# Patient Record
Sex: MALE | Race: WHITE | Employment: OTHER | URBAN - METROPOLITAN AREA
[De-identification: names, ages, dates, MRNs, and addresses within clinical notes are randomized per-mention and may not be internally consistent; named-entity substitution may affect disease eponyms.]

---

## 2019-05-29 ENCOUNTER — NURSING HOME VISIT (OUTPATIENT)
Dept: GERIATRICS | Facility: CLINIC | Age: 79
End: 2019-05-29
Payer: MEDICARE

## 2019-05-29 VITALS
HEART RATE: 61 BPM | SYSTOLIC BLOOD PRESSURE: 158 MMHG | DIASTOLIC BLOOD PRESSURE: 82 MMHG | RESPIRATION RATE: 16 BRPM | HEIGHT: 72 IN | OXYGEN SATURATION: 94 % | TEMPERATURE: 98.6 F | BODY MASS INDEX: 26.33 KG/M2 | WEIGHT: 194.4 LBS

## 2019-05-29 DIAGNOSIS — Z95.0 CARDIAC PACEMAKER IN SITU: ICD-10-CM

## 2019-05-29 DIAGNOSIS — Z98.890 STATUS POST LUMBAR SPINE OPERATIVE PROCEDURE FOR DECOMPRESSION OF SPINAL CORD: ICD-10-CM

## 2019-05-29 DIAGNOSIS — N40.1 BENIGN PROSTATIC HYPERPLASIA WITH LOWER URINARY TRACT SYMPTOMS, SYMPTOM DETAILS UNSPECIFIED: ICD-10-CM

## 2019-05-29 DIAGNOSIS — M43.17 SPONDYLOLISTHESIS OF LUMBOSACRAL REGION: Primary | ICD-10-CM

## 2019-05-29 DIAGNOSIS — M25.552 CHRONIC LEFT HIP PAIN: ICD-10-CM

## 2019-05-29 DIAGNOSIS — R60.0 BILATERAL LEG EDEMA: ICD-10-CM

## 2019-05-29 DIAGNOSIS — I10 ESSENTIAL HYPERTENSION: ICD-10-CM

## 2019-05-29 DIAGNOSIS — R52 ACUTE PAIN: ICD-10-CM

## 2019-05-29 DIAGNOSIS — R53.81 DEBILITY: ICD-10-CM

## 2019-05-29 DIAGNOSIS — G89.29 CHRONIC LEFT HIP PAIN: ICD-10-CM

## 2019-05-29 PROCEDURE — 99310 SBSQ NF CARE HIGH MDM 45: CPT | Performed by: NURSE PRACTITIONER

## 2019-05-29 RX ORDER — HYDROCHLOROTHIAZIDE 50 MG/1
25 TABLET ORAL DAILY
COMMUNITY

## 2019-05-29 RX ORDER — METOPROLOL TARTRATE 100 MG
50 TABLET ORAL 2 TIMES DAILY
COMMUNITY

## 2019-05-29 RX ORDER — MULTIVIT WITH MINERALS/LUTEIN
1 TABLET ORAL DAILY
COMMUNITY

## 2019-05-29 RX ORDER — NICOTINE POLACRILEX 4 MG/1
20 GUM, CHEWING ORAL DAILY
COMMUNITY

## 2019-05-29 RX ORDER — FINASTERIDE 5 MG/1
5 TABLET, FILM COATED ORAL DAILY
COMMUNITY

## 2019-05-29 RX ORDER — OXYBUTYNIN CHLORIDE 15 MG/1
15 TABLET, EXTENDED RELEASE ORAL DAILY
COMMUNITY

## 2019-05-29 RX ORDER — NAPROXEN 500 MG/1
500 TABLET ORAL 2 TIMES DAILY WITH MEALS
COMMUNITY
Start: 2019-08-24 | End: 2019-05-29

## 2019-05-29 RX ORDER — POLYETHYLENE GLYCOL 3350 17 G/17G
1 POWDER, FOR SOLUTION ORAL DAILY
COMMUNITY

## 2019-05-29 RX ORDER — AMLODIPINE BESYLATE 10 MG/1
10 TABLET ORAL DAILY
COMMUNITY

## 2019-05-29 RX ORDER — ACETAMINOPHEN 500 MG
1000 TABLET ORAL 3 TIMES DAILY
COMMUNITY

## 2019-05-29 RX ORDER — SENNOSIDES A AND B 8.6 MG/1
2 TABLET, FILM COATED ORAL 2 TIMES DAILY
COMMUNITY

## 2019-05-29 RX ORDER — LORATADINE 10 MG/1
10 TABLET ORAL DAILY
COMMUNITY

## 2019-05-29 ASSESSMENT — MIFFLIN-ST. JEOR: SCORE: 1621.85

## 2019-05-29 NOTE — LETTER
"    5/29/2019        RE: Thien Garcia  [address]  [place] [address]        [place] [facility]  PRIMARY CARE PROVIDER AND CLINIC:  Marcell Vega PA-C, [facility] [address] / [place]*  Chief Complaint   Patient presents with     Hospital F/U     [facility] Medical Record Number:  [id number]  Place of Service where encounter took place:  [facility] [place] ([facility]) [[id number]]    Thien Garcia  is a 79 year old  (1940), admitted to the above facility from  [facility] . Hospital stay 5/24/2019 through 5/28/2019..  Admitted to this facility for  rehab, medical management and nursing care.    HPI:    HPI information obtained from: facility chart records, facility staff, patient report and [facility] chart review.     Brief Summary of Hospital Course:   Mr. Garcia is a 80 y/o male whom has a h/o HTN, PPM, BPH and chronic Left hip pain whom lives in [facility] and reports he was deferred down to Dr. Velasquez and underwent an extensive lumber surgery:  S/P repair of fractured rods S1 to ilium bilaterally, redo fusion L5 to S1 Bilateral, Decompression - Foraminotomy L5 to S1 Bilateral, anterior fusion L5 to S1 on 5/24/2019 by Dr. Velasquez, Jayden Peterson.  There is no formal DC summary but notes indicate there was a dural tear at one point.  Last note indicated patient doing well, thus he has transitioned to the TCU for ongoing cares and PT/OT.     Updates on Status Since Skilled nursing Admission:   In meeting Mr. Garcia, he reports he is upset because he reports he has chronic Left hip/buttocks pain, and he reports he was under impression this was supposed to take care of it.  Reports his ongoing Left hip/buttocks pain is still 8/10 and not changed.  He endorses 6/10 surgical pain in low back.  He reports ongoing chronic urine retention symptoms for some time, reports he underwent a \"Procedure\" to fix it, reports no change since then.  " Otherwise no complaints, reports he does not want his supplements while at the TCU.  Has already be up and ambulating.     CODE STATUS/ADVANCE DIRECTIVES DISCUSSION:   CPR/Full code   Patient's living condition: lives alone     ALLERGIES: Penicillin g  PAST MEDICAL HISTORY:  has no past medical history on file.  PAST SURGICAL HISTORY:   has no past surgical history on file.  FAMILY HISTORY: family history is not on file.  SOCIAL HISTORY:       Post Discharge Medication Reconciliation Status: discharge medications reconciled and changed, per note/orders (see AVS)    Current Outpatient Medications   Medication Sig Dispense Refill     acetaminophen (TYLENOL) 500 MG tablet Take 1,000 mg by mouth 3 times daily       amLODIPine (NORVASC) 10 MG tablet Take 10 mg by mouth daily       Ascorbic Acid 250 MG CHEW Take 250 mg by mouth daily       CRANBERRY JUICE EXTRACT PO Take 1 tablet by mouth daily       finasteride (PROSCAR) 5 MG tablet Take 5 mg by mouth daily       GLUCOSAMINE-CHONDROIT-VIT C-MN PO Take 400 mg by mouth daily       hydrochlorothiazide (HYDRODIURIL) 50 MG tablet Take 25 mg by mouth daily       loratadine (CLARITIN) 10 MG tablet Take 10 mg by mouth daily       metoprolol tartrate (LOPRESSOR) 100 MG tablet Take 50 mg by mouth 2 times daily       multivitamin (CENTRUM SILVER) tablet Take 1 tablet by mouth daily       omeprazole 20 MG tablet Take 20 mg by mouth daily Before a meal       oxybutynin ER (DITROPAN XL) 15 MG 24 hr tablet Take 15 mg by mouth daily       oxyCODONE HCl (OXAYDO) 5 MG TABA Take 5-10 mg by mouth every 4 hours as needed (5 mg for pain rated 1-5 and 10 mg for pain rated 6-10.) 60 each 0     polyethylene glycol (MIRALAX/GLYCOLAX) packet Take 1 packet by mouth daily       senna (SENOKOT) 8.6 MG tablet Take 2 tablets by mouth 2 times daily       ROS:  7 point ROS done including, light headedness/dizziness, fever/chills, pain, Resp, CV, GI, and  and is negative other than noted in  "HPI.    Vitals:  /82   Pulse 61   Temp 98.6  F (37  C)   Resp 16   Ht 1.816 m (5' 11.5\")   Wt 88.2 kg (194 lb 6.4 oz)   SpO2 94%   BMI 26.74 kg/m        Exam:  GENERAL APPEARANCE:  Elderly male sitting up in chair, NAD, non-toxic.  ENT:  Mouth and oropharynx normal, moist mucous membranes.   RESP:  Lungs CTA.  Regular relaxed breathing effort.  No cough.   CV: S1/S2 no murmur or rubs.  Regular rhythm and rate.    ABDOMEN:   Protuberant, soft, non-tender with active bowel sounds.  No guarding, rigidity, or rebound tenderness.  EXTREMITIES:  1+ bilateral lower extremity edema, no calf tenderness.  Compression in place.   PSYCH: Alert and orientated, pleasant and cooperative.   WOUNDS: Large lumbar incision approximated with steri strips, no s/s of infection.  Small abdominal midline incision approximated with steri strips, no s/s of infection.      Lab/Diagnostic data:  I have personally reviewed labs, which are in facility or EMR chart.     ASSESSMENT/PLAN:  Spondylolisthesis of lumbosacral region  Status post lumbar spine operative procedure for decompression of spinal cord; s/p L5-S1 bilateral redo of decompression, foraminotomy, and anterior fusion, 24 May  Acute pain  S/p large lumbar surgery.  Incisions approximated with steri strips, no s/s of infection.  He is already up and ambulating independently with walker.   -Changed Acetaminophen to TID scheduled.   -Oxycodone PRN.   --Holding home Naproxen 3 months per DC recommendations.   --Ortho f/u July 2nd.     Chronic left hip pain  Has Left hip/buttocks pain that radiates down lateral left side to above knee area.  Reports 8/10 and chronic, unchanged after surgery.  Upset as he was hoping this would fix it.  He is requesting special massage/OT treatments which he was getting up in [facility].   -Analgesics as noted above.   -Did pass along massage/OT instructions he had copy of from [facility], to see if we can perform here.     Essential " hypertension  Cardiac pacemaker in situ  Bilateral leg edema  -Continued PTA Norvasc, hydrochlorothiazide and Metoprolol.   -Using home compression stockings.     Benign prostatic hyperplasia with lower urinary tract symptoms, symptom details unspecified  Has urgency, dribbles, frequent small volume symptoms.   Reports ongoing for some time, is s/p TURP in February, no improvement since then.    -Continues PTA Finasteride and Oxybutynin.   -PVR's q daily x5 days and with symptoms to monitor for retention.   --May need Ortho f/u once home.     Debility  -PT/OT to eval and treat.   -PT/OT given copy of massage/pain treatment he was getting at home.     Orders written by provider at facility  -As noted above.     Total time spent with patient visit at the skilled nursing facility was 45 min including patient visit and review of past records. Greater than 50% of total time spent with counseling and coordinating care due to admission/establish new care, complex medical case, review of HPI, development of POC, patient education and review of POC with pt.      Electronically signed by:  CHUCK Galicia CNP       Sincerely,        CHUCK Galicia CNP

## 2019-05-29 NOTE — PROGRESS NOTES
"[place] [facility]  PRIMARY CARE PROVIDER AND CLINIC:  Marcell Vega PA-C, [facility] [address] / [place]*  Chief Complaint   Patient presents with     Hospital F/U     [facility] Medical Record Number:  [id number]  Place of Service where encounter took place:  [facility] [place] ([facility]) [[id number]]    Thien Garcia  is a 79 year old  (1940), admitted to the above facility from  [facility] . Hospital stay 5/24/2019 through 5/28/2019..  Admitted to this facility for  rehab, medical management and nursing care.    HPI:    HPI information obtained from: facility chart records, facility staff, patient report and [facility] chart review.     Brief Summary of Hospital Course:   Mr. Garcia is a 80 y/o male whom has a h/o HTN, PPM, BPH and chronic Left hip pain whom lives in [facility] and reports he was deferred down to Dr. Velasquez and underwent an extensive lumber surgery:  S/P repair of fractured rods S1 to ilium bilaterally, redo fusion L5 to S1 Bilateral, Decompression - Foraminotomy L5 to S1 Bilateral, anterior fusion L5 to S1 on 5/24/2019 by Dr. Velasquez, Jayden Peterson.  There is no formal DC summary but notes indicate there was a dural tear at one point.  Last note indicated patient doing well, thus he has transitioned to the TCU for ongoing cares and PT/OT.     Updates on Status Since Skilled nursing Admission:   In meeting Mr. Garcia, he reports he is upset because he reports he has chronic Left hip/buttocks pain, and he reports he was under impression this was supposed to take care of it.  Reports his ongoing Left hip/buttocks pain is still 8/10 and not changed.  He endorses 6/10 surgical pain in low back.  He reports ongoing chronic urine retention symptoms for some time, reports he underwent a \"Procedure\" to fix it, reports no change since then.  Otherwise no complaints, reports he does not want his supplements while at the TCU.  Has " "already be up and ambulating.     CODE STATUS/ADVANCE DIRECTIVES DISCUSSION:   CPR/Full code   Patient's living condition: lives alone     ALLERGIES: Penicillin g  PAST MEDICAL HISTORY:  has no past medical history on file.  PAST SURGICAL HISTORY:   has no past surgical history on file.  FAMILY HISTORY: family history is not on file.  SOCIAL HISTORY:       Post Discharge Medication Reconciliation Status: discharge medications reconciled and changed, per note/orders (see AVS)    Current Outpatient Medications   Medication Sig Dispense Refill     acetaminophen (TYLENOL) 500 MG tablet Take 1,000 mg by mouth 3 times daily       amLODIPine (NORVASC) 10 MG tablet Take 10 mg by mouth daily       Ascorbic Acid 250 MG CHEW Take 250 mg by mouth daily       CRANBERRY JUICE EXTRACT PO Take 1 tablet by mouth daily       finasteride (PROSCAR) 5 MG tablet Take 5 mg by mouth daily       GLUCOSAMINE-CHONDROIT-VIT C-MN PO Take 400 mg by mouth daily       hydrochlorothiazide (HYDRODIURIL) 50 MG tablet Take 25 mg by mouth daily       loratadine (CLARITIN) 10 MG tablet Take 10 mg by mouth daily       metoprolol tartrate (LOPRESSOR) 100 MG tablet Take 50 mg by mouth 2 times daily       multivitamin (CENTRUM SILVER) tablet Take 1 tablet by mouth daily       omeprazole 20 MG tablet Take 20 mg by mouth daily Before a meal       oxybutynin ER (DITROPAN XL) 15 MG 24 hr tablet Take 15 mg by mouth daily       oxyCODONE HCl (OXAYDO) 5 MG TABA Take 5-10 mg by mouth every 4 hours as needed (5 mg for pain rated 1-5 and 10 mg for pain rated 6-10.) 60 each 0     polyethylene glycol (MIRALAX/GLYCOLAX) packet Take 1 packet by mouth daily       senna (SENOKOT) 8.6 MG tablet Take 2 tablets by mouth 2 times daily       ROS:  7 point ROS done including, light headedness/dizziness, fever/chills, pain, Resp, CV, GI, and  and is negative other than noted in HPI.    Vitals:  /82   Pulse 61   Temp 98.6  F (37  C)   Resp 16   Ht 1.816 m (5' 11.5\")   " Wt 88.2 kg (194 lb 6.4 oz)   SpO2 94%   BMI 26.74 kg/m       Exam:  GENERAL APPEARANCE:  Elderly male sitting up in chair, NAD, non-toxic.  ENT:  Mouth and oropharynx normal, moist mucous membranes.   RESP:  Lungs CTA.  Regular relaxed breathing effort.  No cough.   CV: S1/S2 no murmur or rubs.  Regular rhythm and rate.    ABDOMEN:   Protuberant, soft, non-tender with active bowel sounds.  No guarding, rigidity, or rebound tenderness.  EXTREMITIES:  1+ bilateral lower extremity edema, no calf tenderness.  Compression in place.   PSYCH: Alert and orientated, pleasant and cooperative.   WOUNDS: Large lumbar incision approximated with steri strips, no s/s of infection.  Small abdominal midline incision approximated with steri strips, no s/s of infection.      Lab/Diagnostic data:  I have personally reviewed labs, which are in facility or EMR chart.     ASSESSMENT/PLAN:  Spondylolisthesis of lumbosacral region  Status post lumbar spine operative procedure for decompression of spinal cord; s/p L5-S1 bilateral redo of decompression, foraminotomy, and anterior fusion, 24 May  Acute pain  S/p large lumbar surgery.  Incisions approximated with steri strips, no s/s of infection.  He is already up and ambulating independently with walker.   -Changed Acetaminophen to TID scheduled.   -Oxycodone PRN.   --Holding home Naproxen 3 months per DC recommendations.   --Ortho f/u July 2nd.     Chronic left hip pain  Has Left hip/buttocks pain that radiates down lateral left side to above knee area.  Reports 8/10 and chronic, unchanged after surgery.  Upset as he was hoping this would fix it.  He is requesting special massage/OT treatments which he was getting up in [facility].   -Analgesics as noted above.   -Did pass along massage/OT instructions he had copy of from [facility], to see if we can perform here.     Essential hypertension  Cardiac pacemaker in situ  Bilateral leg edema  -Continued PTA Norvasc, hydrochlorothiazide and  Metoprolol.   -Using home compression stockings.     Benign prostatic hyperplasia with lower urinary tract symptoms, symptom details unspecified  Has urgency, dribbles, frequent small volume symptoms.   Reports ongoing for some time, is s/p TURP in February, no improvement since then.    -Continues PTA Finasteride and Oxybutynin.   -PVR's q daily x5 days and with symptoms to monitor for retention.   --May need Ortho f/u once home.     Debility  -PT/OT to eval and treat.   -PT/OT given copy of massage/pain treatment he was getting at home.     Orders written by provider at facility  -As noted above.     Total time spent with patient visit at the skilled nursing facility was 45 min including patient visit and review of past records. Greater than 50% of total time spent with counseling and coordinating care due to admission/establish new care, complex medical case, review of HPI, development of POC, patient education and review of POC with pt.      Electronically signed by:  CHUCK Galicia CNP

## 2019-05-30 ENCOUNTER — DOCUMENTATION ONLY (OUTPATIENT)
Dept: OTHER | Facility: CLINIC | Age: 79
End: 2019-05-30

## 2019-05-31 ENCOUNTER — NURSING HOME VISIT (OUTPATIENT)
Dept: GERIATRICS | Facility: CLINIC | Age: 79
End: 2019-05-31
Payer: MEDICARE

## 2019-05-31 ENCOUNTER — NURSING HOME VISIT (OUTPATIENT)
Dept: GERIATRICS | Facility: CLINIC | Age: 79
End: 2019-05-31

## 2019-05-31 VITALS
TEMPERATURE: 98.6 F | SYSTOLIC BLOOD PRESSURE: 152 MMHG | BODY MASS INDEX: 25.73 KG/M2 | HEART RATE: 67 BPM | WEIGHT: 190 LBS | HEIGHT: 72 IN | OXYGEN SATURATION: 96 % | RESPIRATION RATE: 18 BRPM | DIASTOLIC BLOOD PRESSURE: 75 MMHG

## 2019-05-31 DIAGNOSIS — R53.81 PHYSICAL DECONDITIONING: ICD-10-CM

## 2019-05-31 DIAGNOSIS — I10 ESSENTIAL HYPERTENSION: ICD-10-CM

## 2019-05-31 DIAGNOSIS — Z47.89 AFTERCARE FOLLOWING SURGERY OF THE MUSCULOSKELETAL SYSTEM: Primary | ICD-10-CM

## 2019-05-31 DIAGNOSIS — N40.0 BENIGN PROSTATIC HYPERPLASIA WITHOUT LOWER URINARY TRACT SYMPTOMS: ICD-10-CM

## 2019-05-31 DIAGNOSIS — Z98.890 STATUS POST LUMBAR SPINE OPERATIVE PROCEDURE FOR DECOMPRESSION OF SPINAL CORD: Primary | ICD-10-CM

## 2019-05-31 DIAGNOSIS — Z98.1 S/P LUMBAR FUSION: ICD-10-CM

## 2019-05-31 DIAGNOSIS — K27.9 PUD (PEPTIC ULCER DISEASE): ICD-10-CM

## 2019-05-31 DIAGNOSIS — M79.18 PAIN IN LEFT BUTTOCK: ICD-10-CM

## 2019-05-31 DIAGNOSIS — K59.01 SLOW TRANSIT CONSTIPATION: ICD-10-CM

## 2019-05-31 PROCEDURE — 99306 1ST NF CARE HIGH MDM 50: CPT | Performed by: INTERNAL MEDICINE

## 2019-05-31 RX ORDER — GABAPENTIN 100 MG/1
100 CAPSULE ORAL AT BEDTIME
COMMUNITY
End: 2019-06-06

## 2019-05-31 ASSESSMENT — MIFFLIN-ST. JEOR: SCORE: 1601.89

## 2019-05-31 NOTE — PROGRESS NOTES
[place] [facility]  INITIAL VISIT NOTE  May 31, 2019    PRIMARY CARE PROVIDER AND CLINIC:  [facility] [address] / [place]*    Chief Complaint   Patient presents with     Hospital F/U       HPI:    Thien Garcia is a 79 year old  (1940) male who was seen at [facility] on May 31, 2019 for an initial visit. Medical history is notable for HTN, SSS s/p PPM and BPH s/p TURP. He was hospitalized at [facility] from 5/24/19 to 5/28/19 where he is s/p repair fractured rods S1 to ilium, redo fusion L5 to S1, bilateral decompression and foraminotomy L5 to S1 and anterior fusion L5 to S1. Surgery without complication.  was admitted to this facility for medical management and rehab.     Today, Mr. Garcia is seen in his room after breakfast. His main concern is left buttock pain. He can pinpoint the one spot that hurts. This is subacute to chronic. He thought the back surgery was going to fix it. He's had PT for this in [place], [place] and PT here is doing deep massage and muscle release. Pain is worse when he first gets out of bed. It is sometimes burning. Does not radiate down his leg. No numbness or tingling. No chest pain or dyspnea. No abdominal pain. No diarrhea or constipation. Discussed with nursing - no concerns today. Discussed with OT re: buttock pain.     CODE STATUS:   CPR/Full code     ALLERGIES:     Allergies   Allergen Reactions     Penicillin G        PAST MEDICAL HISTORY:   HTN, BPH, ventricular arrhythmia s/p PPM    PAST SURGICAL HISTORY:   No past surgical history on file.    FAMILY HISTORY:   Reviewed and non-contributory    SOCIAL HISTORY:   . Lives in an apartment in [place], [place]. 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     MEDICATIONS:  Current Outpatient Medications   Medication Sig Dispense Refill     acetaminophen (TYLENOL) 500 MG tablet Take 1,000 mg by mouth 3 times daily       amLODIPine (NORVASC) 10 MG tablet Take 10 mg by mouth  "daily       Ascorbic Acid 250 MG CHEW Take 250 mg by mouth daily       finasteride (PROSCAR) 5 MG tablet Take 5 mg by mouth daily       gabapentin (NEURONTIN) 100 MG capsule Take 100 mg by mouth At Bedtime        hydrochlorothiazide (HYDRODIURIL) 50 MG tablet Take 25 mg by mouth daily       loratadine (CLARITIN) 10 MG tablet Take 10 mg by mouth daily       metoprolol tartrate (LOPRESSOR) 100 MG tablet Take 50 mg by mouth 2 times daily       multivitamin (CENTRUM SILVER) tablet Take 1 tablet by mouth daily       omeprazole 20 MG tablet Take 20 mg by mouth daily Before a meal       oxybutynin ER (DITROPAN XL) 15 MG 24 hr tablet Take 15 mg by mouth daily       oxyCODONE HCl (OXAYDO) 5 MG TABA Take 5-10 mg by mouth every 4 hours as needed (5 mg for pain rated 1-5 and 10 mg for pain rated 6-10.) 60 each 0     polyethylene glycol (MIRALAX/GLYCOLAX) packet Take 1 packet by mouth daily       senna (SENOKOT) 8.6 MG tablet Take 2 tablets by mouth 2 times daily           ROS:  10 point ROS neg other than the symptoms noted above in the HPI.    PHYSICAL EXAM:  /75   Pulse 67   Temp 98.6  F (37  C)   Resp 18   Ht 1.816 m (5' 11.5\")   Wt 86.2 kg (190 lb)   SpO2 96%   BMI 26.13 kg/m     Gen: sitting on edge of the bed, alert, cooperative and in no acute distress  HEENT: normocephalic; oropharynx clear  Card: RRR, S1, S2, no murmurs  Resp: lungs clear to auscultation bilaterally  GI: abdomen soft, not-tender  MSK: normal muscle tone, no LE edema; point tenderness near of L buttock   Neuro: CX II-XII grossly in tact; ROM in all four extremities grossly in tact  Psych: alert and oriented x3; normal affect  Skin: both anterior and posterior incisions healing well without erythema or drainage     LABORATORY/IMAGING DATA:  Reviewed as per Epic    ASSESSMENT/PLAN:    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 Incisions healing well. Minimal back pain  -- analgesia with APAP 1000 mg TID and oxycodone 5-10 mg q4h PRN  -- DVT ppx - was not discharged on any; he is ambulating frequently with a walker   -- PT/OT  -- follow up with ortho as scheduled on 7/2/19    Acute on Chronic Left Buttock Pain  This is very limiting. Point tenderness on exam. PT is doing deep tissue, massage as well as (I believe) ultras sound. Was evaluated by CANDY Alvarez PA-C with recommendation for gabapentin.   -- will start gabapentin 100 mg at bedtime daily through the weekend and medical team will re-eval on Monday  -- ongoing PT  -- very much appreciate expertise and assistance of CANDY Alvarez    HTN  Sick Sinus Syndrome s/p PPM   SBPs 150s. Likely elevated in setting of pain  -- continues on amlodipine 10 mg daily, hydrochlorothiazide 25 mg daily and metoprolol 50 mg BID  -- follow BPs and adjust medications as needed    BPH s/p TURP (2/26/19)  Follow with urology - most recent clinic visit 5/14/19.   -- continues on finasteride 5 mg daily and oxybutynin 15 mg daily     Elevated PSA  Follows with urology. Per last clinic note given his age, no longer following PSA. Path from TURP was without malignancy.   -- management per urology    PUD  -- continues on 20 mg daily     Slow Transit Constipation  -- continues on Miralax 17g daily and Senna 2 tabs BID  -- adjust bowel regimen as needed    Physical Deconditioning  In setting of hospitalization and underlying medical conditions  -- ongoing PT/OT      Electronically signed by:  Ariana Gracia MD

## 2019-05-31 NOTE — PROGRESS NOTES
Ortho Nursing home visit    Thien Garcia is a 79 year old male who resides at [facility]    Patient is seen today for Left buttock pain, Chief complaint, following extensive AP/ Fusion revision, done 1 week ago at [facility], due to failed hardware, and surgical findings were, Failed fusion L-5/_S1; New hardware, and bone infuse and cage was used for repair,   Also noted due to scaring, dural tear was identified during procedure; Patient has no post-op complications and was transferred to TCU.      No past medical history on file.   No past surgical history on file.     Allergies   Allergen Reactions     Penicillin G       There were no vitals taken for this visit.     Exam: Supine, allows PROM to both LE, Neg SLR to left LE, neuro intact, 5/5 strength in lower motor Sharif. Denies any numbness, no changes in B&B habits.  Pain while supine in 0/10 but attempts at standing, cause pain posterior SI joint buttock , able to palpate area of concern today, denies any radicular symptoms, and stated, : this was there before surgery.        X-rays not available; from [facility]    ASSESSMENT / PLAN: Discussed trial of Neurontin , 100 MG day 1,   100 mg BID day 2  100 mg TID day3, and to follow.    To help with pain assc: with nerve involvement;     Patient would like to try medication, and feels that the ultrasound is also helping'        Piper ALDRICH  With Ariana Gracia MD.  [phone]

## 2019-05-31 NOTE — LETTER
5/31/2019        RE: Thien Garcia  [address]  [place] [address]        [place] [facility]  INITIAL VISIT NOTE  May 31, 2019    PRIMARY CARE PROVIDER AND CLINIC:  [facility] [address] / [place]*    Chief Complaint   Patient presents with     Hospital F/U       HPI:    Thien Garcia is a 79 year old  (1940) male who [facility] [place] [facility] for an initial visit. Medical history is notable for HTN, SSS s/p PPM and BPH s/p TURP. He was hospitalized at [facility] from 5/24/19 to 5/28/19 where he is s/p repair fractured rods S1 to ilium, redo fusion L5 to S1, bilateral decompression and foraminotomy L5 to S1 and anterior fusion L5 to S1. Surgery without complication.  was admitted to this facility for medical management and rehab.     Today, Mr. Garcia is seen in his room after breakfast. His main concern is left buttock pain. He can pinpoint the one spot that hurts. This is subacute to chronic. He thought the back surgery was going to fix it. He's had PT for this in [place], [place] and PT here is doing deep massage and muscle release. Pain is worse when he first gets out of bed. It is sometimes burning. Does not radiate down his leg. No numbness or tingling. No chest pain or dyspnea. No abdominal pain. No diarrhea or constipation. Discussed with nursing - no concerns today. Discussed with OT re: buttock pain.     CODE STATUS:   CPR/Full code     ALLERGIES:     Allergies   Allergen Reactions     Penicillin G        PAST MEDICAL HISTORY:   HTN, BPH, ventricular arrhythmia s/p PPM    PAST SURGICAL HISTORY:   No past surgical history on file.    FAMILY HISTORY:   Reviewed and non-contributory    SOCIAL HISTORY:   . Lives in an apartment in [place], [place]. 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     MEDICATIONS:  Current Outpatient Medications   Medication Sig Dispense Refill     acetaminophen (TYLENOL) 500 MG tablet Take 1,000  "mg by mouth 3 times daily       amLODIPine (NORVASC) 10 MG tablet Take 10 mg by mouth daily       Ascorbic Acid 250 MG CHEW Take 250 mg by mouth daily       finasteride (PROSCAR) 5 MG tablet Take 5 mg by mouth daily       gabapentin (NEURONTIN) 100 MG capsule Take 100 mg by mouth At Bedtime        hydrochlorothiazide (HYDRODIURIL) 50 MG tablet Take 25 mg by mouth daily       loratadine (CLARITIN) 10 MG tablet Take 10 mg by mouth daily       metoprolol tartrate (LOPRESSOR) 100 MG tablet Take 50 mg by mouth 2 times daily       multivitamin (CENTRUM SILVER) tablet Take 1 tablet by mouth daily       omeprazole 20 MG tablet Take 20 mg by mouth daily Before a meal       oxybutynin ER (DITROPAN XL) 15 MG 24 hr tablet Take 15 mg by mouth daily       oxyCODONE HCl (OXAYDO) 5 MG TABA Take 5-10 mg by mouth every 4 hours as needed (5 mg for pain rated 1-5 and 10 mg for pain rated 6-10.) 60 each 0     polyethylene glycol (MIRALAX/GLYCOLAX) packet Take 1 packet by mouth daily       senna (SENOKOT) 8.6 MG tablet Take 2 tablets by mouth 2 times daily           ROS:  10 point ROS neg other than the symptoms noted above in the HPI.    PHYSICAL EXAM:  /75   Pulse 67   Temp 98.6  F (37  C)   Resp 18   Ht 1.816 m (5' 11.5\")   Wt 86.2 kg (190 lb)   SpO2 96%   BMI 26.13 kg/m      Gen: sitting on edge of the bed, alert, cooperative and in no acute distress  HEENT: normocephalic; oropharynx clear  Card: RRR, S1, S2, no murmurs  Resp: lungs clear to auscultation bilaterally  GI: abdomen soft, not-tender  MSK: normal muscle tone, no LE edema; point tenderness near of L buttock   Neuro: CX II-XII grossly in tact; ROM in all four extremities grossly in tact  Psych: alert and oriented x3; normal affect  Skin: both anterior and posterior incisions healing well without erythema or drainage     LABORATORY/IMAGING DATA:  Reviewed as per Epic    ASSESSMENT/PLAN:    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 Incisions healing well. Minimal back pain  -- analgesia with APAP 1000 mg TID and oxycodone 5-10 mg q4h PRN  -- DVT ppx - was not discharged on any; he is ambulating frequently with a walker   -- PT/OT  -- follow up with ortho as scheduled on 7/2/19    Acute on Chronic Left Buttock Pain  This is very limiting. Point tenderness on exam. PT is doing deep tissue, massage as well as (I believe) ultras sound. Was evaluated by CANDY Alvarez PA-C with recommendation for gabapentin.   -- will start gabapentin 100 mg at bedtime daily through the weekend and medical team will re-eval on Monday  -- ongoing PT  -- very much appreciate expertise and assistance of CANDY Alvarez    HTN  Sick Sinus Syndrome s/p PPM   SBPs 150s. Likely elevated in setting of pain  -- continues on amlodipine 10 mg daily, hydrochlorothiazide 25 mg daily and metoprolol 50 mg BID  -- follow BPs and adjust medications as needed    BPH s/p TURP (2/26/19)  Follow with urology - most recent clinic visit 5/14/19.   -- continues on finasteride 5 mg daily and oxybutynin 15 mg daily     Elevated PSA  Follows with urology. Per last clinic note given his age, no longer following PSA. Path from TURP was without malignancy.   -- management per urology    PUD  -- continues on 20 mg daily     Slow Transit Constipation  -- continues on Miralax 17g daily and Senna 2 tabs BID  -- adjust bowel regimen as needed    Physical Deconditioning  In setting of hospitalization and underlying medical conditions  -- ongoing PT/OT      Electronically signed by:  Ariana Gracia MD                        Sincerely,        Ariana Gracia MD

## 2019-06-04 ENCOUNTER — HOSPITAL LABORATORY (OUTPATIENT)
Dept: OTHER | Facility: CLINIC | Age: 79
End: 2019-06-04

## 2019-06-04 LAB
ANION GAP SERPL CALCULATED.3IONS-SCNC: 7 MMOL/L (ref 3–14)
BUN SERPL-MCNC: 21 MG/DL (ref 7–30)
CALCIUM SERPL-MCNC: 9.6 MG/DL (ref 8.5–10.1)
CHLORIDE SERPL-SCNC: 105 MMOL/L (ref 94–109)
CO2 SERPL-SCNC: 27 MMOL/L (ref 20–32)
CREAT SERPL-MCNC: 0.87 MG/DL (ref 0.66–1.25)
ERYTHROCYTE [DISTWIDTH] IN BLOOD BY AUTOMATED COUNT: 13.6 % (ref 10–15)
GFR SERPL CREATININE-BSD FRML MDRD: 81 ML/MIN/{1.73_M2}
GLUCOSE SERPL-MCNC: 190 MG/DL (ref 70–99)
HCT VFR BLD AUTO: 36 % (ref 40–53)
HGB BLD-MCNC: 12.1 G/DL (ref 13.3–17.7)
MCH RBC QN AUTO: 30.6 PG (ref 26.5–33)
MCHC RBC AUTO-ENTMCNC: 33.6 G/DL (ref 31.5–36.5)
MCV RBC AUTO: 91 FL (ref 78–100)
PLATELET # BLD AUTO: 449 10E9/L (ref 150–450)
POTASSIUM SERPL-SCNC: 4.1 MMOL/L (ref 3.4–5.3)
RBC # BLD AUTO: 3.96 10E12/L (ref 4.4–5.9)
SODIUM SERPL-SCNC: 139 MMOL/L (ref 133–144)
WBC # BLD AUTO: 14.6 10E9/L (ref 4–11)

## 2019-06-06 ENCOUNTER — DISCHARGE SUMMARY NURSING HOME (OUTPATIENT)
Dept: GERIATRICS | Facility: CLINIC | Age: 79
End: 2019-06-06
Payer: MEDICARE

## 2019-06-06 VITALS
HEIGHT: 72 IN | RESPIRATION RATE: 18 BRPM | DIASTOLIC BLOOD PRESSURE: 71 MMHG | BODY MASS INDEX: 25.73 KG/M2 | OXYGEN SATURATION: 92 % | WEIGHT: 190 LBS | SYSTOLIC BLOOD PRESSURE: 160 MMHG | HEART RATE: 78 BPM | TEMPERATURE: 98.4 F

## 2019-06-06 DIAGNOSIS — M43.17 SPONDYLOLISTHESIS OF LUMBOSACRAL REGION: ICD-10-CM

## 2019-06-06 DIAGNOSIS — R53.81 DEBILITY: ICD-10-CM

## 2019-06-06 DIAGNOSIS — Z98.1 S/P LUMBAR FUSION: ICD-10-CM

## 2019-06-06 DIAGNOSIS — Z79.899 POLYPHARMACY: ICD-10-CM

## 2019-06-06 DIAGNOSIS — G89.29 CHRONIC LEFT HIP PAIN: ICD-10-CM

## 2019-06-06 DIAGNOSIS — N40.0 BENIGN PROSTATIC HYPERPLASIA WITHOUT LOWER URINARY TRACT SYMPTOMS: ICD-10-CM

## 2019-06-06 DIAGNOSIS — Z98.890 STATUS POST LUMBAR SPINE OPERATIVE PROCEDURE FOR DECOMPRESSION OF SPINAL CORD: Primary | ICD-10-CM

## 2019-06-06 DIAGNOSIS — M25.552 CHRONIC LEFT HIP PAIN: ICD-10-CM

## 2019-06-06 DIAGNOSIS — R35.1 NOCTURIA: ICD-10-CM

## 2019-06-06 DIAGNOSIS — I10 ESSENTIAL HYPERTENSION: ICD-10-CM

## 2019-06-06 DIAGNOSIS — M79.18 PAIN IN LEFT BUTTOCK: ICD-10-CM

## 2019-06-06 DIAGNOSIS — Z95.0 CARDIAC PACEMAKER IN SITU: ICD-10-CM

## 2019-06-06 DIAGNOSIS — R52 ACUTE PAIN: ICD-10-CM

## 2019-06-06 PROCEDURE — 99316 NF DSCHRG MGMT 30 MIN+: CPT | Performed by: NURSE PRACTITIONER

## 2019-06-06 RX ORDER — GABAPENTIN 100 MG/1
100 CAPSULE ORAL 2 TIMES DAILY
Start: 2019-06-06

## 2019-06-06 ASSESSMENT — MIFFLIN-ST. JEOR: SCORE: 1601.89

## 2019-06-06 NOTE — LETTER
6/6/2019        RE: Thien Gacria  [address]  [place] [address]        [place] [facility] DISCHARGE SUMMARY  PATIENT'S NAME: Thien Garcia  YOB: 1940  MEDICAL RECORD NUMBER:  [id number]  Place of Service where encounter took place:  [facility] [place] ([facility]) [[id number]]    PRIMARY CARE PROVIDER AND CLINIC RESPONSIBLE AFTER TRANSFER:   Marcell Vega PA-C, [facility] [address] / [place]*       Transferring providers: CHUCK Galicia CNP, Ariana Gracia MD  Recent Hospitalization/ED:  [facility]  stay 5/24/2019 to 5/28/2019.  Date of SNF Admission: May / 28 / 2019  Date of SNF (anticipated) Discharge: June / 07 / 2019  Discharged to: previous independent home  Cognitive Scores: SLUMS: 15/30 and CPT: 4.9/5.6  Physical Function: Low fall risk, 300-500 ft with walker  DME: Walker    CODE STATUS/ADVANCE DIRECTIVES DISCUSSION:  Full Code     ALLERGIES: Penicillin g    DISCHARGE DIAGNOSIS/NURSING FACILITY COURSE:   Mr. Garcia is a 80 y/o male whom has a h/o HTN, PPM, BPH and chronic Left hip pain whom lives in [facility] and reports he was deferred down to Dr. Velasquez and underwent an extensive lumber surgery:  S/P repair of fractured rods S1 to ilium bilaterally, redo fusion L5 to S1 Bilateral, Decompression - Foraminotomy L5 to S1 Bilateral, anterior fusion L5 to S1 on 5/24/2019 by Jayden Walter.  There is no formal DC summary but notes indicate there was a dural tear at one point.  Last note indicated patient doing well, thus he has transitioned to the TCU for ongoing cares and PT/OT.     From surgical note: Underwent repair fractured rods S1 to ilium Bilateral, redo fusion L5 to S1 Bilateral, Decompression - Foraminotomy L5 to S1 Bilateral, anterior fusion L5 to S1 on 5/24/2019 by Jayden Walter MD; anesthesia General, EBL: <25 cc, OR time 5 Hr 44 Min 2 Sec with no immediate  complications.    While at the TCU Mr. Garcia did have some pain issues but more from his chronic Left hip/SI area than his actual surgical area.  He was in hospital 3-4 days post surgery, and when he arrived to the TCU he was already up and walking a great deal with a walker independently.  Thus recovered nicely from the surgery.  We did have one of our Ortho PA's visit him and assess his chronic Left hip/SI pain.  They felt it was more SI/nerve pain and though a trail of Gabapentin might work, thus we did start that and had good results.  While at the TCU Mr. Garcia continued to also complain of nocturia, but also not new.  Does have a h/o recent TURP.  Due to recovered briskly from his surgery, was up and mobilizing well, he discharged back to home with outpatient PT.      In meeting Mr. Garcia today, he reports he's ready to return home, has a plan to get back up [facility].  He report his chronic SI pain as much improved on the Gabapentin, but also likes his ongoing massage therapy he get's.  He continues to endorse the nocturia, reports he has to strain a bit to get flow going, but no dysuria or other pain.  No other complaints.     Spondylolisthesis of lumbosacral region  Status post lumbar spine operative procedure for decompression of spinal cord; s/p L5-S1 bilateral redo of decompression, foraminotomy, and anterior fusion, 24 May  S/P lumbar fusion  Acute pain  Detailed surgery noted above.  Has a large spinal and small anterior abdominal incision, but approximated with steri strips, no s/s of infection.  Actual surgical pain has been minimal, was up and walking with walker independently few days after surgery.   -f/u with Surgery planned on/around 2 July.   -Analgesics noted below.   --Surgery did note avoid/hold NSAIDS/Naproxen for 3 months.     Pain in left buttock, chronic  Chronic left hip pain  He reports having workup done up [place], we do not have access to those records, but he reports  "Left buttocks pain as 10/10 and ongoing for some time.  Reports he was getting special massage on Left buttocks area with some results.  We and our Ortho PA felt the pain may be from the SI joint area, PA recommended trial of Gabapentin.  We started with 100 mg's at bedtime and went to BID.  He has had good reduction of pain with this.   -Holding Naproxen as noted above.   -Gabapentin at 100 mg's BID, continue to slowly increase for affect, left to PCP discretion.   -Acetaminophen TID.   -Oxycodone 5-10 mg's q4h PRN.     Essential hypertension  Cardiac pacemaker in situ  SBP has been slightly elevated, 140-160's with WNL HR's but in setting of pain and post surgery, asymptomatic.  Thus we did not make any changes in the acute setting.   No current s/s of clinical CHF.   -Continue to monitor SBP's and adjustments left to PCP discretion.     Benign prostatic hyperplasia without lower urinary tract symptoms  Nocturia  Reports a TURP back in February, reports that helped with retention issues, but he notes ongoing retention symptoms at times, reports spending 5+ minutes at times trying to get \"All\" the urine out.  Reports he is upset that he needs to go 3-4 times a night, but also reports that as ongoing.  Some PVR's at the TCU/rehab here were 100-200 ml's at times.    -Continues Finasteride and Oxybutynin.   --We did recommend he repeat f/u with Urology due to increased PVR's and nocturia.     Debility  Did well with PT/OT.   -We recommend ongoing outpatient PT with [facility].     Polypharmacy  His Vit C, Cranberry, Glucosamine, Vit E were held in TCU/Rehab, can be resumed upon returning home.     Past Medical History:  has no past medical history on file.    Discharge Medications:  Current Outpatient Medications   Medication Sig Dispense Refill     acetaminophen (TYLENOL) 500 MG tablet Take 1,000 mg by mouth 3 times daily       amLODIPine (NORVASC) 10 MG tablet Take 10 mg by mouth daily       Ascorbic " "Acid 250 MG CHEW Take 250 mg by mouth daily       finasteride (PROSCAR) 5 MG tablet Take 5 mg by mouth daily       gabapentin (NEURONTIN) 100 MG capsule Take 1 capsule (100 mg) by mouth 2 times daily       hydrochlorothiazide (HYDRODIURIL) 50 MG tablet Take 25 mg by mouth daily       loratadine (CLARITIN) 10 MG tablet Take 10 mg by mouth daily       metoprolol tartrate (LOPRESSOR) 100 MG tablet Take 50 mg by mouth 2 times daily       multivitamin (CENTRUM SILVER) tablet Take 1 tablet by mouth daily       omeprazole 20 MG tablet Take 20 mg by mouth daily Before a meal       oxybutynin ER (DITROPAN XL) 15 MG 24 hr tablet Take 15 mg by mouth daily       oxyCODONE HCl (OXAYDO) 5 MG TABA Take 5-10 mg by mouth every 4 hours as needed (5 mg for pain rated 1-5 and 10 mg for pain rated 6-10.) 60 each 0     polyethylene glycol (MIRALAX/GLYCOLAX) packet Take 1 packet by mouth daily       senna (SENOKOT) 8.6 MG tablet Take 2 tablets by mouth 2 times daily       Medication Changes/Rationale:   -As noted above.     Controlled medications sent with patient:   Script for PRN Oxycodone medication for 30 tabs and 0 refills given to patient at discharge to have them fill at their out patient pharmacy     ROS:   7 point ROS done including, light headedness/dizziness, fever/chills, pain, Resp, CV, GI, and  and is negative other than noted in HPI.    Physical Exam:   Vitals: /71   Pulse 78   Temp 98.4  F (36.9  C)   Resp 18   Ht 1.816 m (5' 11.5\")   Wt 86.2 kg (190 lb)   SpO2 92%   BMI 26.13 kg/m     BMI= Body mass index is 26.13 kg/m .     GENERAL APPEARANCE:  Elderly male resting in bed, NAD, non-toxic.  ENT:  Mouth and oropharynx normal, moist mucous membranes.   RESP:  Lungs CTA.  Regular relaxed breathing effort.  No cough.   CV: S1/S2 no murmur or rubs.  Regular rhythm and rate.    ABDOMEN:   Protuberant, soft, non-tender with active bowel sounds.  No guarding, rigidity, or rebound tenderness.  EXTREMITIES:  Trace " bilateral lower extremity edema, no calf tenderness.     PSYCH: Alert and orientated, pleasant and cooperative.   WOUNDS: Large lumbar incision approximated with steri strips, no s/s of infection.  Small abdominal midline incision approximated with steri strips, no s/s of infection.       SNF labs: Labs done in SNF are in [place] EPIC. Please refer to them using EPIC/Care Everywhere.    DISCHARGE PLAN:    Follow up labs: No labs orders/due    Medical Follow Up:      Follow up with primary care provider in 1-2 weeks   Follow up with Surgery as instructed   We have recommended he f/u with Urology as noted above      MTM referral needed and placed by this provider: No    Discharge Services: Outpatient therapy - [facility]    TOTAL DISCHARGE TIME:   Greater than 30 minutes  Electronically signed by:  CHUCK Galicia CNP       Sincerely,        CHUCK Galicia CNP

## 2019-06-06 NOTE — PROGRESS NOTES
[place] [facility] DISCHARGE SUMMARY  PATIENT'S NAME: Thien Garcia  YOB: 1940  MEDICAL RECORD NUMBER:  [id number]  Place of Service where encounter took place:  [facility] [place] ([facility]) [[id number]]    PRIMARY CARE PROVIDER AND CLINIC RESPONSIBLE AFTER TRANSFER:   Marcell Vega PA-C, [facility] [address]  / [place]*       Transferring providers: CHUCK Galicia CNP, Ariana Gracia MD  Recent Hospitalization/ED:  [facility]  stay 5/24/2019 to 5/28/2019.  Date of SNF Admission: May / 28 / 2019  Date of SNF (anticipated) Discharge: June / 07 / 2019  Discharged to: previous independent home  Cognitive Scores: SLUMS: 15/30 and CPT: 4.9/5.6  Physical Function: Low fall risk, 300-500 ft with walker  DME: Walker    CODE STATUS/ADVANCE DIRECTIVES DISCUSSION:  Full Code     ALLERGIES: Penicillin g    DISCHARGE DIAGNOSIS/NURSING FACILITY COURSE:   Mr. Garcia is a 78 y/o male whom has a h/o HTN, PPM, BPH and chronic Left hip pain whom lives in [facility] and reports he was deferred down to Dr. Velasquez and underwent an extensive lumber surgery:  S/P repair of fractured rods S1 to ilium bilaterally, redo fusion L5 to S1 Bilateral, Decompression - Foraminotomy L5 to S1 Bilateral, anterior fusion L5 to S1 on 5/24/2019 by Jayden Walter.  There is no formal DC summary but notes indicate there was a dural tear at one point.  Last note indicated patient doing well, thus he has transitioned to the TCU for ongoing cares and PT/OT.     From surgical note: Underwent repair fractured rods S1 to ilium Bilateral, redo fusion L5 to S1 Bilateral, Decompression - Foraminotomy L5 to S1 Bilateral, anterior fusion L5 to S1 on 5/24/2019 by Jayden Walter MD; anesthesia General, EBL: <25 cc, OR time 5 Hr 44 Min 2 Sec with no immediate complications.    While at the TCU Mr. Garcia did have some pain issues but more from  his chronic Left hip/SI area than his actual surgical area.  He was in hospital 3-4 days post surgery, and when he arrived to the TCU he was already up and walking a great deal with a walker independently.  Thus recovered nicely from the surgery.  We did have one of our Ortho PA's visit him and assess his chronic Left hip/SI pain.  They felt it was more SI/nerve pain and though a trail of Gabapentin might work, thus we did start that and had good results.  While at the TCU Mr. Garcia continued to also complain of nocturia, but also not new.  Does have a h/o recent TURP.  Due to recovered briskly from his surgery, was up and mobilizing well, he discharged back to home with outpatient PT.      In meeting Mr. Garcia today, he reports he's ready to return home, has a plan to get back up [facility].  He report his chronic SI pain as much improved on the Gabapentin, but also likes his ongoing massage therapy he get's.  He continues to endorse the nocturia, reports he has to strain a bit to get flow going, but no dysuria or other pain.  No other complaints.     Spondylolisthesis of lumbosacral region  Status post lumbar spine operative procedure for decompression of spinal cord; s/p L5-S1 bilateral redo of decompression, foraminotomy, and anterior fusion, 24 May  S/P lumbar fusion  Acute pain  Detailed surgery noted above.  Has a large spinal and small anterior abdominal incision, but approximated with steri strips, no s/s of infection.  Actual surgical pain has been minimal, was up and walking with walker independently few days after surgery.   -f/u with Surgery planned on/around 2 July.   -Analgesics noted below.   --Surgery did note avoid/hold NSAIDS/Naproxen for 3 months.     Pain in left buttock, chronic  Chronic left hip pain  He reports having workup done up [place], we do not have access to those records, but he reports Left buttocks pain as 10/10 and ongoing for some time.  Reports he was getting special  "massage on Left buttocks area with some results.  We and our Ortho PA felt the pain may be from the SI joint area, PA recommended trial of Gabapentin.  We started with 100 mg's at bedtime and went to BID.  He has had good reduction of pain with this.   -Holding Naproxen as noted above.   -Gabapentin at 100 mg's BID, continue to slowly increase for affect, left to PCP discretion.   -Acetaminophen TID.   -Oxycodone 5-10 mg's q4h PRN.     Essential hypertension  Cardiac pacemaker in situ  SBP has been slightly elevated, 140-160's with WNL HR's but in setting of pain and post surgery, asymptomatic.  Thus we did not make any changes in the acute setting.   No current s/s of clinical CHF.   -Continue to monitor SBP's and adjustments left to PCP discretion.     Benign prostatic hyperplasia without lower urinary tract symptoms  Nocturia  Reports a TURP back in February, reports that helped with retention issues, but he notes ongoing retention symptoms at times, reports spending 5+ minutes at times trying to get \"All\" the urine out.  Reports he is upset that he needs to go 3-4 times a night, but also reports that as ongoing.  Some PVR's at the TCU/rehab here were 100-200 ml's at times.    -Continues Finasteride and Oxybutynin.   --We did recommend he repeat f/u with Urology due to increased PVR's and nocturia.     Debility  Did well with PT/OT.   -We recommend ongoing outpatient PT with [facility].     Polypharmacy  His Vit C, Cranberry, Glucosamine, Vit E were held in TCU/Rehab, can be resumed upon returning home.     Past Medical History:  has no past medical history on file.    Discharge Medications:  Current Outpatient Medications   Medication Sig Dispense Refill     acetaminophen (TYLENOL) 500 MG tablet Take 1,000 mg by mouth 3 times daily       amLODIPine (NORVASC) 10 MG tablet Take 10 mg by mouth daily       Ascorbic Acid 250 MG CHEW Take 250 mg by mouth daily       finasteride (PROSCAR) 5 MG tablet " "Take 5 mg by mouth daily       gabapentin (NEURONTIN) 100 MG capsule Take 1 capsule (100 mg) by mouth 2 times daily       hydrochlorothiazide (HYDRODIURIL) 50 MG tablet Take 25 mg by mouth daily       loratadine (CLARITIN) 10 MG tablet Take 10 mg by mouth daily       metoprolol tartrate (LOPRESSOR) 100 MG tablet Take 50 mg by mouth 2 times daily       multivitamin (CENTRUM SILVER) tablet Take 1 tablet by mouth daily       omeprazole 20 MG tablet Take 20 mg by mouth daily Before a meal       oxybutynin ER (DITROPAN XL) 15 MG 24 hr tablet Take 15 mg by mouth daily       oxyCODONE HCl (OXAYDO) 5 MG TABA Take 5-10 mg by mouth every 4 hours as needed (5 mg for pain rated 1-5 and 10 mg for pain rated 6-10.) 60 each 0     polyethylene glycol (MIRALAX/GLYCOLAX) packet Take 1 packet by mouth daily       senna (SENOKOT) 8.6 MG tablet Take 2 tablets by mouth 2 times daily       Medication Changes/Rationale:   -As noted above.     Controlled medications sent with patient:   Script for PRN Oxycodone medication for 30 tabs and 0 refills given to patient at discharge to have them fill at their out patient pharmacy     ROS:   7 point ROS done including, light headedness/dizziness, fever/chills, pain, Resp, CV, GI, and  and is negative other than noted in HPI.    Physical Exam:   Vitals: /71   Pulse 78   Temp 98.4  F (36.9  C)   Resp 18   Ht 1.816 m (5' 11.5\")   Wt 86.2 kg (190 lb)   SpO2 92%   BMI 26.13 kg/m    BMI= Body mass index is 26.13 kg/m .     GENERAL APPEARANCE:  Elderly male resting in bed, NAD, non-toxic.  ENT:  Mouth and oropharynx normal, moist mucous membranes.   RESP:  Lungs CTA.  Regular relaxed breathing effort.  No cough.   CV: S1/S2 no murmur or rubs.  Regular rhythm and rate.    ABDOMEN:   Protuberant, soft, non-tender with active bowel sounds.  No guarding, rigidity, or rebound tenderness.  EXTREMITIES: Trace bilateral lower extremity edema, no calf tenderness.     PSYCH: Alert and orientated, " pleasant and cooperative.   WOUNDS: Large lumbar incision approximated with steri strips, no s/s of infection.  Small abdominal midline incision approximated with steri strips, no s/s of infection.       SNF labs: Labs done in SNF are in [place] EPIC. Please refer to them using EPIC/Care Everywhere.    DISCHARGE PLAN:    Follow up labs: No labs orders/due    Medical Follow Up:      Follow up with primary care provider in 1-2 weeks   Follow up with Surgery as instructed   We have recommended he f/u with Urology as noted above      MTM referral needed and placed by this provider: No    Discharge Services: Outpatient therapy - [facility]    TOTAL DISCHARGE TIME:   Greater than 30 minutes  Electronically signed by:  CHUCK Galicia CNP

## 2023-07-31 ENCOUNTER — LAB REQUISITION (OUTPATIENT)
Dept: LAB | Facility: CLINIC | Age: 83
End: 2023-07-31
Payer: MEDICARE

## 2023-07-31 PROCEDURE — 88325 CONSLTJ COMPRE RVW REC REPRT: CPT | Performed by: DERMATOLOGY

## 2023-08-03 LAB
PATH REPORT.COMMENTS IMP SPEC: NORMAL
PATH REPORT.FINAL DX SPEC: NORMAL
PATH REPORT.GROSS SPEC: NORMAL
PATH REPORT.MICROSCOPIC SPEC OTHER STN: NORMAL
PATH REPORT.RELEVANT HX SPEC: NORMAL
PATH REPORT.RELEVANT HX SPEC: NORMAL
PATH REPORT.SITE OF ORIGIN SPEC: NORMAL

## 2023-10-29 ENCOUNTER — DOCUMENTATION ONLY (OUTPATIENT)
Dept: OTHER | Facility: CLINIC | Age: 83
End: 2023-10-29
Payer: MEDICARE

## 2024-04-03 ENCOUNTER — LAB REQUISITION (OUTPATIENT)
Dept: LAB | Facility: CLINIC | Age: 84
End: 2024-04-03
Payer: MEDICARE

## 2024-04-03 DIAGNOSIS — U07.1 COVID-19: ICD-10-CM

## 2024-04-03 PROCEDURE — 87635 SARS-COV-2 COVID-19 AMP PRB: CPT | Mod: ORL | Performed by: NURSE PRACTITIONER

## 2024-04-04 LAB — SARS-COV-2 RNA RESP QL NAA+PROBE: NEGATIVE

## 2024-04-07 PROCEDURE — 87635 SARS-COV-2 COVID-19 AMP PRB: CPT | Mod: ORL | Performed by: NURSE PRACTITIONER

## 2024-04-08 ENCOUNTER — LAB REQUISITION (OUTPATIENT)
Dept: LAB | Facility: CLINIC | Age: 84
End: 2024-04-08
Payer: MEDICARE

## 2024-04-08 DIAGNOSIS — Z79.899 OTHER LONG TERM (CURRENT) DRUG THERAPY: ICD-10-CM

## 2024-04-08 LAB — SARS-COV-2 RNA RESP QL NAA+PROBE: NEGATIVE

## 2024-04-09 LAB
ACANTHOCYTES BLD QL SMEAR: ABNORMAL
AUER BODIES BLD QL SMEAR: ABNORMAL
BASO STIPL BLD QL SMEAR: ABNORMAL
BASOPHILS # BLD AUTO: 0 10E3/UL (ref 0–0.2)
BASOPHILS NFR BLD AUTO: 1 %
BITE CELLS BLD QL SMEAR: ABNORMAL
BLISTER CELLS BLD QL SMEAR: ABNORMAL
BURR CELLS BLD QL SMEAR: SLIGHT
DACRYOCYTES BLD QL SMEAR: ABNORMAL
ELLIPTOCYTES BLD QL SMEAR: ABNORMAL
EOSINOPHIL # BLD AUTO: 0.2 10E3/UL (ref 0–0.7)
EOSINOPHIL NFR BLD AUTO: 3 %
ERYTHROCYTE [DISTWIDTH] IN BLOOD BY AUTOMATED COUNT: 14.3 % (ref 10–15)
FRAGMENTS BLD QL SMEAR: ABNORMAL
HCT VFR BLD AUTO: 32.9 % (ref 40–53)
HGB BLD-MCNC: 10.6 G/DL (ref 13.3–17.7)
HGB C CRYSTALS: ABNORMAL
HOWELL-JOLLY BOD BLD QL SMEAR: ABNORMAL
IMM GRANULOCYTES # BLD: 0 10E3/UL
IMM GRANULOCYTES NFR BLD: 1 %
LYMPHOCYTES # BLD AUTO: 0.7 10E3/UL (ref 0.8–5.3)
LYMPHOCYTES NFR BLD AUTO: 13 %
MCH RBC QN AUTO: 29.7 PG (ref 26.5–33)
MCHC RBC AUTO-ENTMCNC: 32.2 G/DL (ref 31.5–36.5)
MCV RBC AUTO: 92 FL (ref 78–100)
MONOCYTES # BLD AUTO: 0.4 10E3/UL (ref 0–1.3)
MONOCYTES NFR BLD AUTO: 7 %
NEUTROPHILS # BLD AUTO: 4.4 10E3/UL (ref 1.6–8.3)
NEUTROPHILS NFR BLD AUTO: 75 %
NEUTS HYPERSEG BLD QL SMEAR: ABNORMAL
NRBC # BLD AUTO: 0 10E3/UL
NRBC BLD AUTO-RTO: 0 /100
PLAT MORPH BLD: ABNORMAL
PLATELET # BLD AUTO: 265 10E3/UL (ref 150–450)
POLYCHROMASIA BLD QL SMEAR: ABNORMAL
RBC # BLD AUTO: 3.57 10E6/UL (ref 4.4–5.9)
RBC AGGLUT BLD QL: ABNORMAL
RBC MORPH BLD: ABNORMAL
ROULEAUX BLD QL SMEAR: ABNORMAL
SICKLE CELLS BLD QL SMEAR: ABNORMAL
SMUDGE CELLS BLD QL SMEAR: ABNORMAL
SPHEROCYTES BLD QL SMEAR: ABNORMAL
STOMATOCYTES BLD QL SMEAR: ABNORMAL
TARGETS BLD QL SMEAR: ABNORMAL
TOXIC GRANULES BLD QL SMEAR: ABNORMAL
VARIANT LYMPHS BLD QL SMEAR: ABNORMAL
WBC # BLD AUTO: 5.8 10E3/UL (ref 4–11)

## 2024-04-09 PROCEDURE — 85025 COMPLETE CBC W/AUTO DIFF WBC: CPT | Performed by: NURSE PRACTITIONER

## 2024-04-09 PROCEDURE — 84443 ASSAY THYROID STIM HORMONE: CPT | Performed by: NURSE PRACTITIONER

## 2024-04-09 PROCEDURE — 36415 COLL VENOUS BLD VENIPUNCTURE: CPT | Performed by: NURSE PRACTITIONER

## 2024-04-09 PROCEDURE — P9603 ONE-WAY ALLOW PRORATED MILES: HCPCS | Mod: LTC | Performed by: NURSE PRACTITIONER

## 2024-04-09 PROCEDURE — 83735 ASSAY OF MAGNESIUM: CPT | Performed by: NURSE PRACTITIONER

## 2024-04-09 PROCEDURE — 82607 VITAMIN B-12: CPT | Performed by: NURSE PRACTITIONER

## 2024-04-09 PROCEDURE — 80053 COMPREHEN METABOLIC PANEL: CPT | Performed by: NURSE PRACTITIONER

## 2024-04-10 LAB
ALBUMIN SERPL BCG-MCNC: 3.8 G/DL (ref 3.5–5.2)
ALP SERPL-CCNC: 100 U/L (ref 40–150)
ALT SERPL W P-5'-P-CCNC: 16 U/L (ref 0–70)
ANION GAP SERPL CALCULATED.3IONS-SCNC: 14 MMOL/L (ref 7–15)
AST SERPL W P-5'-P-CCNC: 26 U/L (ref 0–45)
BILIRUB SERPL-MCNC: <0.2 MG/DL
BUN SERPL-MCNC: 34.9 MG/DL (ref 8–23)
CALCIUM SERPL-MCNC: 9.5 MG/DL (ref 8.8–10.2)
CHLORIDE SERPL-SCNC: 102 MMOL/L (ref 98–107)
CREAT SERPL-MCNC: 1.28 MG/DL (ref 0.67–1.17)
DEPRECATED HCO3 PLAS-SCNC: 23 MMOL/L (ref 22–29)
EGFRCR SERPLBLD CKD-EPI 2021: 55 ML/MIN/1.73M2
GLUCOSE SERPL-MCNC: 168 MG/DL (ref 70–99)
MAGNESIUM SERPL-MCNC: 2.5 MG/DL (ref 1.7–2.3)
POTASSIUM SERPL-SCNC: 4.5 MMOL/L (ref 3.4–5.3)
PROT SERPL-MCNC: 6.5 G/DL (ref 6.4–8.3)
SODIUM SERPL-SCNC: 139 MMOL/L (ref 135–145)
TSH SERPL DL<=0.005 MIU/L-ACNC: 1.59 UIU/ML (ref 0.3–4.2)
VIT B12 SERPL-MCNC: 818 PG/ML (ref 232–1245)

## 2024-04-15 ENCOUNTER — LAB REQUISITION (OUTPATIENT)
Dept: LAB | Facility: CLINIC | Age: 84
End: 2024-04-15
Payer: MEDICARE

## 2024-04-15 DIAGNOSIS — E11.42 TYPE 2 DIABETES MELLITUS WITH DIABETIC POLYNEUROPATHY (H): ICD-10-CM

## 2024-04-16 LAB — HBA1C MFR BLD: 7.1 %

## 2024-04-16 PROCEDURE — P9604 ONE-WAY ALLOW PRORATED TRIP: HCPCS | Mod: LTC | Performed by: REGISTERED NURSE

## 2024-04-16 PROCEDURE — 83036 HEMOGLOBIN GLYCOSYLATED A1C: CPT | Performed by: REGISTERED NURSE

## 2024-04-16 PROCEDURE — 36415 COLL VENOUS BLD VENIPUNCTURE: CPT | Performed by: REGISTERED NURSE

## 2024-08-08 ENCOUNTER — LAB REQUISITION (OUTPATIENT)
Dept: LAB | Facility: CLINIC | Age: 84
End: 2024-08-08
Payer: MEDICARE

## 2024-08-08 DIAGNOSIS — Z11.52 ENCOUNTER FOR SCREENING FOR COVID-19: ICD-10-CM

## 2024-08-08 PROCEDURE — 87635 SARS-COV-2 COVID-19 AMP PRB: CPT | Performed by: INTERNAL MEDICINE

## 2024-08-09 ENCOUNTER — LAB REQUISITION (OUTPATIENT)
Dept: LAB | Facility: CLINIC | Age: 84
End: 2024-08-09
Payer: MEDICARE

## 2024-08-09 DIAGNOSIS — Z11.52 ENCOUNTER FOR SCREENING FOR COVID-19: ICD-10-CM

## 2024-08-09 LAB — SARS-COV-2 RNA RESP QL NAA+PROBE: NEGATIVE

## 2024-08-10 PROCEDURE — 87635 SARS-COV-2 COVID-19 AMP PRB: CPT | Performed by: INTERNAL MEDICINE

## 2024-08-11 LAB — SARS-COV-2 RNA RESP QL NAA+PROBE: NEGATIVE

## 2024-08-12 ENCOUNTER — LAB REQUISITION (OUTPATIENT)
Dept: LAB | Facility: CLINIC | Age: 84
End: 2024-08-12
Payer: MEDICARE

## 2024-08-12 DIAGNOSIS — Z11.52 ENCOUNTER FOR SCREENING FOR COVID-19: ICD-10-CM

## 2024-08-12 PROCEDURE — 87635 SARS-COV-2 COVID-19 AMP PRB: CPT | Performed by: INTERNAL MEDICINE

## 2024-08-13 LAB — SARS-COV-2 RNA RESP QL NAA+PROBE: NEGATIVE
